# Patient Record
Sex: MALE | Race: WHITE
[De-identification: names, ages, dates, MRNs, and addresses within clinical notes are randomized per-mention and may not be internally consistent; named-entity substitution may affect disease eponyms.]

---

## 2017-01-23 ENCOUNTER — HOSPITAL ENCOUNTER (OUTPATIENT)
Dept: HOSPITAL 53 - M OUTALCOH | Age: 47
End: 2017-01-23
Attending: PSYCHIATRY & NEUROLOGY
Payer: MEDICARE

## 2017-01-23 DIAGNOSIS — F11.10: ICD-10-CM

## 2017-01-23 DIAGNOSIS — F12.10: Primary | ICD-10-CM

## 2017-01-30 ENCOUNTER — HOSPITAL ENCOUNTER (OUTPATIENT)
Dept: HOSPITAL 53 - M OUTALCOH | Age: 47
LOS: 1 days | Discharge: HOME | End: 2017-01-31
Attending: PSYCHIATRY & NEUROLOGY
Payer: MEDICARE

## 2017-01-30 DIAGNOSIS — F12.10: Primary | ICD-10-CM

## 2017-01-30 DIAGNOSIS — F11.10: ICD-10-CM

## 2017-01-30 DIAGNOSIS — F17.200: ICD-10-CM

## 2017-02-23 ENCOUNTER — HOSPITAL ENCOUNTER (OUTPATIENT)
Dept: HOSPITAL 53 - M OUTALCOH | Age: 47
LOS: 5 days | End: 2017-02-28
Attending: PSYCHIATRY & NEUROLOGY
Payer: MEDICARE

## 2017-02-23 DIAGNOSIS — F17.200: ICD-10-CM

## 2017-02-23 DIAGNOSIS — F11.10: ICD-10-CM

## 2017-02-23 DIAGNOSIS — F12.10: Primary | ICD-10-CM

## 2017-03-30 ENCOUNTER — HOSPITAL ENCOUNTER (OUTPATIENT)
Dept: HOSPITAL 53 - M OUTALCOH | Age: 47
LOS: 1 days | End: 2017-03-31
Attending: PSYCHIATRY & NEUROLOGY
Payer: MEDICARE

## 2017-03-30 DIAGNOSIS — F17.200: ICD-10-CM

## 2017-03-30 DIAGNOSIS — F12.10: Primary | ICD-10-CM

## 2017-03-30 DIAGNOSIS — F11.10: ICD-10-CM

## 2017-04-11 ENCOUNTER — HOSPITAL ENCOUNTER (OUTPATIENT)
Dept: HOSPITAL 53 - M OUTALCOH | Age: 47
LOS: 19 days | End: 2017-04-30
Attending: PSYCHIATRY & NEUROLOGY
Payer: MEDICARE

## 2017-04-11 DIAGNOSIS — F11.10: ICD-10-CM

## 2017-04-11 DIAGNOSIS — F17.200: ICD-10-CM

## 2017-04-11 DIAGNOSIS — F12.10: Primary | ICD-10-CM

## 2017-04-18 ENCOUNTER — HOSPITAL ENCOUNTER (OUTPATIENT)
Dept: HOSPITAL 53 - M EKG | Age: 47
End: 2017-04-18
Attending: FAMILY MEDICINE
Payer: MEDICARE

## 2017-04-18 DIAGNOSIS — R00.2: Primary | ICD-10-CM

## 2017-04-18 NOTE — ECGEPIP
Stationary ECG Study

                              [facility]

                                       

                                       Test Date:    2017

Pat Name:     GILLIAN MOROCHO     Department:   

Patient ID:   [id number]                 Room:         -

Gender:       M                        Technician:   TRISTAN

:          1970               Requested By: Michael Ochoa 

Order Number: [id number]     Reading MD:   Baldo Zarate

                                 Measurements

Intervals                              Axis          

Rate:         109                      P:            56

RI:           152                      QRS:          61

QRSD:         83                       T:            78

QT:           317                                    

QTc:          429                                    

                           Interpretive Statements

SINUS TACHYCARDIA WITH OCCASIONAL SUPRAVENTRICULAR PREMATURE COMPLEXES

ABNORMAL RHYTHM ECG

 

Electronically Signed On 2017 19:02:08 EDT by Baldo Zarate

## 2020-02-13 ENCOUNTER — HOSPITAL ENCOUNTER (OUTPATIENT)
Dept: HOSPITAL 53 - M LAB REF | Age: 50
End: 2020-02-13
Attending: NURSE PRACTITIONER
Payer: COMMERCIAL

## 2020-02-13 DIAGNOSIS — M54.9: ICD-10-CM

## 2020-02-13 DIAGNOSIS — E78.5: ICD-10-CM

## 2020-02-13 DIAGNOSIS — E55.9: ICD-10-CM

## 2020-02-13 DIAGNOSIS — M79.7: ICD-10-CM

## 2020-02-13 DIAGNOSIS — F41.8: ICD-10-CM

## 2020-02-13 DIAGNOSIS — N40.1: Primary | ICD-10-CM

## 2020-02-13 LAB
25(OH)D3 SERPL-MCNC: 18.9 NG/ML (ref 30–100)
ALBUMIN SERPL BCG-MCNC: 3.8 GM/DL (ref 3.2–5.2)
ALT SERPL W P-5'-P-CCNC: 29 U/L (ref 12–78)
BASOPHILS # BLD AUTO: 0.1 10^3/UL (ref 0–0.2)
BASOPHILS NFR BLD AUTO: 0.6 % (ref 0–1)
BILIRUB SERPL-MCNC: 0.3 MG/DL (ref 0.2–1)
BUN SERPL-MCNC: 12 MG/DL (ref 7–18)
CALCIUM SERPL-MCNC: 9 MG/DL (ref 8.5–10.1)
CHLORIDE SERPL-SCNC: 107 MEQ/L (ref 98–107)
CHOLEST SERPL-MCNC: 220 MG/DL (ref ?–200)
CHOLEST/HDLC SERPL: 5.79 {RATIO} (ref ?–5)
CO2 SERPL-SCNC: 25 MEQ/L (ref 21–32)
CREAT SERPL-MCNC: 0.83 MG/DL (ref 0.7–1.3)
EOSINOPHIL # BLD AUTO: 0.4 10^3/UL (ref 0–0.5)
EOSINOPHIL NFR BLD AUTO: 2.9 % (ref 0–3)
EST. AVERAGE GLUCOSE BLD GHB EST-MCNC: 117 MG/DL (ref 60–110)
GFR SERPL CREATININE-BSD FRML MDRD: > 60 ML/MIN/{1.73_M2} (ref 60–?)
GLUCOSE SERPL-MCNC: 92 MG/DL (ref 70–100)
HCT VFR BLD AUTO: 48.3 % (ref 42–52)
HDLC SERPL-MCNC: 38 MG/DL (ref 40–?)
HGB BLD-MCNC: 15.9 G/DL (ref 13.5–17.5)
LDLC SERPL CALC-MCNC: 147 MG/DL (ref ?–100)
LYMPHOCYTES # BLD AUTO: 3.2 10^3/UL (ref 1.5–5)
LYMPHOCYTES NFR BLD AUTO: 25.6 % (ref 24–44)
MCH RBC QN AUTO: 30.3 PG (ref 27–33)
MCHC RBC AUTO-ENTMCNC: 32.9 G/DL (ref 32–36.5)
MCV RBC AUTO: 92.2 FL (ref 80–96)
MONOCYTES # BLD AUTO: 0.8 10^3/UL (ref 0–0.8)
MONOCYTES NFR BLD AUTO: 6.6 % (ref 0–5)
NEUTROPHILS # BLD AUTO: 8 10^3/UL (ref 1.5–8.5)
NEUTROPHILS NFR BLD AUTO: 63.7 % (ref 36–66)
NONHDLC SERPL-MCNC: 182 MG/DL
PLATELET # BLD AUTO: 350 10^3/UL (ref 150–450)
POTASSIUM SERPL-SCNC: 4.3 MEQ/L (ref 3.5–5.1)
PROT SERPL-MCNC: 7.1 GM/DL (ref 6.4–8.2)
RBC # BLD AUTO: 5.24 10^6/UL (ref 4.3–6.1)
SODIUM SERPL-SCNC: 140 MEQ/L (ref 136–145)
T4 FREE SERPL-MCNC: 1.15 NG/DL (ref 0.76–1.46)
TRIGL SERPL-MCNC: 175 MG/DL (ref ?–150)
TSH SERPL DL<=0.005 MIU/L-ACNC: 1.56 UIU/ML (ref 0.36–3.74)
WBC # BLD AUTO: 12.6 10^3/UL (ref 4–10)

## 2020-02-13 PROCEDURE — 80061 LIPID PANEL: CPT

## 2020-02-13 PROCEDURE — 80053 COMPREHEN METABOLIC PANEL: CPT

## 2020-02-13 PROCEDURE — 85025 COMPLETE CBC W/AUTO DIFF WBC: CPT

## 2020-02-13 PROCEDURE — 84443 ASSAY THYROID STIM HORMONE: CPT

## 2020-02-13 PROCEDURE — 84439 ASSAY OF FREE THYROXINE: CPT

## 2020-02-13 PROCEDURE — 83036 HEMOGLOBIN GLYCOSYLATED A1C: CPT

## 2020-02-13 PROCEDURE — 82306 VITAMIN D 25 HYDROXY: CPT

## 2020-05-14 ENCOUNTER — HOSPITAL ENCOUNTER (OUTPATIENT)
Dept: HOSPITAL 53 - M LAB REF | Age: 50
End: 2020-05-14
Attending: NURSE PRACTITIONER
Payer: COMMERCIAL

## 2020-05-14 DIAGNOSIS — R03.0: ICD-10-CM

## 2020-05-14 DIAGNOSIS — R73.03: Primary | ICD-10-CM

## 2020-05-14 DIAGNOSIS — E55.9: ICD-10-CM

## 2020-05-14 DIAGNOSIS — E78.5: ICD-10-CM

## 2020-05-14 DIAGNOSIS — K21.9: ICD-10-CM

## 2020-05-14 DIAGNOSIS — F17.200: ICD-10-CM

## 2020-05-14 LAB
25(OH)D3 SERPL-MCNC: 15.7 NG/ML (ref 30–100)
ALBUMIN SERPL BCG-MCNC: 3.7 GM/DL (ref 3.2–5.2)
ALT SERPL W P-5'-P-CCNC: 41 U/L (ref 12–78)
BASOPHILS # BLD AUTO: 0.1 10^3/UL (ref 0–0.2)
BASOPHILS NFR BLD AUTO: 0.7 % (ref 0–1)
BILIRUB SERPL-MCNC: 0.3 MG/DL (ref 0.2–1)
BUN SERPL-MCNC: 11 MG/DL (ref 7–18)
CALCIUM SERPL-MCNC: 8.7 MG/DL (ref 8.5–10.1)
CHLORIDE SERPL-SCNC: 104 MEQ/L (ref 98–107)
CHOLEST SERPL-MCNC: 219 MG/DL (ref ?–200)
CHOLEST/HDLC SERPL: 6.84 {RATIO} (ref ?–5)
CO2 SERPL-SCNC: 28 MEQ/L (ref 21–32)
CREAT SERPL-MCNC: 0.74 MG/DL (ref 0.7–1.3)
EOSINOPHIL # BLD AUTO: 0.5 10^3/UL (ref 0–0.5)
EOSINOPHIL NFR BLD AUTO: 5 % (ref 0–3)
EST. AVERAGE GLUCOSE BLD GHB EST-MCNC: 117 MG/DL (ref 60–110)
GFR SERPL CREATININE-BSD FRML MDRD: > 60 ML/MIN/{1.73_M2} (ref 60–?)
GLUCOSE SERPL-MCNC: 89 MG/DL (ref 70–100)
HCT VFR BLD AUTO: 45.3 % (ref 42–52)
HDLC SERPL-MCNC: 32 MG/DL (ref 40–?)
HGB BLD-MCNC: 14.9 G/DL (ref 13.5–17.5)
LDLC SERPL CALC-MCNC: 138 MG/DL (ref ?–100)
LYMPHOCYTES # BLD AUTO: 3.3 10^3/UL (ref 1.5–5)
LYMPHOCYTES NFR BLD AUTO: 31.6 % (ref 24–44)
MCH RBC QN AUTO: 29.9 PG (ref 27–33)
MCHC RBC AUTO-ENTMCNC: 32.9 G/DL (ref 32–36.5)
MCV RBC AUTO: 91 FL (ref 80–96)
MONOCYTES # BLD AUTO: 1 10^3/UL (ref 0–0.8)
MONOCYTES NFR BLD AUTO: 9.4 % (ref 0–5)
NEUTROPHILS # BLD AUTO: 5.5 10^3/UL (ref 1.5–8.5)
NEUTROPHILS NFR BLD AUTO: 52.7 % (ref 36–66)
NONHDLC SERPL-MCNC: 187 MG/DL
PLATELET # BLD AUTO: 320 10^3/UL (ref 150–450)
POTASSIUM SERPL-SCNC: 4.5 MEQ/L (ref 3.5–5.1)
PROT SERPL-MCNC: 7.3 GM/DL (ref 6.4–8.2)
RBC # BLD AUTO: 4.98 10^6/UL (ref 4.3–6.1)
SODIUM SERPL-SCNC: 138 MEQ/L (ref 136–145)
TRIGL SERPL-MCNC: 247 MG/DL (ref ?–150)
WBC # BLD AUTO: 10.5 10^3/UL (ref 4–10)

## 2021-05-07 ENCOUNTER — HOSPITAL ENCOUNTER (EMERGENCY)
Dept: HOSPITAL 53 - M ED | Age: 51
Discharge: HOME | End: 2021-05-07
Payer: COMMERCIAL

## 2021-05-07 VITALS — BODY MASS INDEX: 33.67 KG/M2 | WEIGHT: 214.51 LBS | HEIGHT: 67 IN

## 2021-05-07 VITALS — SYSTOLIC BLOOD PRESSURE: 138 MMHG | DIASTOLIC BLOOD PRESSURE: 98 MMHG

## 2021-05-07 DIAGNOSIS — G47.30: ICD-10-CM

## 2021-05-07 DIAGNOSIS — K85.10: ICD-10-CM

## 2021-05-07 DIAGNOSIS — F43.10: ICD-10-CM

## 2021-05-07 DIAGNOSIS — E27.8: ICD-10-CM

## 2021-05-07 DIAGNOSIS — R10.9: ICD-10-CM

## 2021-05-07 DIAGNOSIS — I10: ICD-10-CM

## 2021-05-07 DIAGNOSIS — F11.23: Primary | ICD-10-CM

## 2021-05-07 DIAGNOSIS — F12.10: ICD-10-CM

## 2021-05-07 DIAGNOSIS — R11.2: ICD-10-CM

## 2021-05-07 DIAGNOSIS — J44.9: ICD-10-CM

## 2021-05-07 DIAGNOSIS — F17.200: ICD-10-CM

## 2021-05-07 LAB
ALBUMIN SERPL BCG-MCNC: 3.6 GM/DL (ref 3.2–5.2)
ALT SERPL W P-5'-P-CCNC: 39 U/L (ref 12–78)
AMPHETAMINES UR QL SCN: NEGATIVE
BARBITURATES UR QL SCN: NEGATIVE
BASOPHILS # BLD AUTO: 0.1 10^3/UL (ref 0–0.2)
BASOPHILS NFR BLD AUTO: 0.4 % (ref 0–1)
BENZODIAZ UR QL SCN: NEGATIVE
BILIRUB CONJ SERPL-MCNC: < 0.1 MG/DL (ref 0–0.2)
BILIRUB SERPL-MCNC: 0.6 MG/DL (ref 0.2–1)
BUN SERPL-MCNC: 11 MG/DL (ref 7–18)
BZE UR QL SCN: NEGATIVE
CALCIUM SERPL-MCNC: 8.8 MG/DL (ref 8.5–10.1)
CANNABINOIDS UR QL SCN: POSITIVE
CHLORIDE SERPL-SCNC: 111 MEQ/L (ref 98–107)
CK MB CFR.DF SERPL CALC: 0.43
CK MB SERPL-MCNC: 3.3 NG/ML (ref ?–3.6)
CK SERPL-CCNC: 773 U/L (ref 39–308)
CO2 SERPL-SCNC: 24 MEQ/L (ref 21–32)
CREAT SERPL-MCNC: 0.75 MG/DL (ref 0.7–1.3)
EOSINOPHIL # BLD AUTO: 0.2 10^3/UL (ref 0–0.5)
EOSINOPHIL NFR BLD AUTO: 1.4 % (ref 0–3)
GFR SERPL CREATININE-BSD FRML MDRD: > 60 ML/MIN/{1.73_M2} (ref 60–?)
GLUCOSE SERPL-MCNC: 111 MG/DL (ref 70–100)
HCT VFR BLD AUTO: 41.6 % (ref 42–52)
HGB BLD-MCNC: 14.2 G/DL (ref 13.5–17.5)
LIPASE SERPL-CCNC: 973 U/L (ref 73–393)
LYMPHOCYTES # BLD AUTO: 2.5 10^3/UL (ref 1.5–5)
LYMPHOCYTES NFR BLD AUTO: 14.3 % (ref 24–44)
MCH RBC QN AUTO: 30.3 PG (ref 27–33)
MCHC RBC AUTO-ENTMCNC: 34.1 G/DL (ref 32–36.5)
MCV RBC AUTO: 88.9 FL (ref 80–96)
METHADONE UR QL SCN: NEGATIVE
MONOCYTES # BLD AUTO: 0.8 10^3/UL (ref 0–0.8)
MONOCYTES NFR BLD AUTO: 4.7 % (ref 2–8)
NEUTROPHILS # BLD AUTO: 13.5 10^3/UL (ref 1.5–8.5)
NEUTROPHILS NFR BLD AUTO: 78.5 % (ref 36–66)
OPIATES UR QL SCN: NEGATIVE
PCP UR QL SCN: NEGATIVE
PLATELET # BLD AUTO: 330 10^3/UL (ref 150–450)
POTASSIUM SERPL-SCNC: 4.4 MEQ/L (ref 3.5–5.1)
PROT SERPL-MCNC: 7 GM/DL (ref 6.4–8.2)
RBC # BLD AUTO: 4.68 10^6/UL (ref 4.3–6.1)
SODIUM SERPL-SCNC: 141 MEQ/L (ref 136–145)
TROPONIN I SERPL-MCNC: < 0.02 NG/ML (ref ?–0.1)
WBC # BLD AUTO: 17.1 10^3/UL (ref 4–10)

## 2021-05-07 PROCEDURE — 85025 COMPLETE CBC W/AUTO DIFF WBC: CPT

## 2021-05-07 PROCEDURE — 96375 TX/PRO/DX INJ NEW DRUG ADDON: CPT

## 2021-05-07 PROCEDURE — 84484 ASSAY OF TROPONIN QUANT: CPT

## 2021-05-07 PROCEDURE — 96374 THER/PROPH/DIAG INJ IV PUSH: CPT

## 2021-05-07 PROCEDURE — 81001 URINALYSIS AUTO W/SCOPE: CPT

## 2021-05-07 PROCEDURE — 82550 ASSAY OF CK (CPK): CPT

## 2021-05-07 PROCEDURE — 80048 BASIC METABOLIC PNL TOTAL CA: CPT

## 2021-05-07 PROCEDURE — 99284 EMERGENCY DEPT VISIT MOD MDM: CPT

## 2021-05-07 PROCEDURE — 96361 HYDRATE IV INFUSION ADD-ON: CPT

## 2021-05-07 PROCEDURE — 82553 CREATINE MB FRACTION: CPT

## 2021-05-07 PROCEDURE — 80076 HEPATIC FUNCTION PANEL: CPT

## 2021-05-07 PROCEDURE — 74021 RADEX ABDOMEN 3+ VIEWS: CPT

## 2021-05-07 PROCEDURE — 80307 DRUG TEST PRSMV CHEM ANLYZR: CPT

## 2021-05-07 PROCEDURE — 74177 CT ABD & PELVIS W/CONTRAST: CPT

## 2021-05-07 PROCEDURE — 83690 ASSAY OF LIPASE: CPT

## 2021-05-07 NOTE — REP
INDICATION:

mid abd pain, n/v, leukocytosis



COMPARISON:

07/01/2016.



TECHNIQUE:

CT Scan of the abdomen and pelvis was performed with intravenous administration of 100

cc of Isovue 370, without oral contrast.  Sagittal and coronal reconstruction images

are performed.



FINDINGS:

Lung bases: 2 tiny calcified granulomas seen in the right lower lobe.

Liver:  There is diffuse fatty infiltration of the liver.  Calcified granulomas seen

at the dome of the liver.

Gallbladder:  Unremarkable.

Spleen:  Normal.

Adrenals:  Bilateral adrenal nodules are unchanged in size, on the right measuring

approximately 3.3 cm in maximum diameter and on the left 1.8 cm.  There are 2 new

coarse calcifications in superior aspect of the right adrenal nodule.

Pancreas:  Normal.

Kidneys:  Normal.

Small and large bowel: There is diverticulosis of the left colon with no CT evidence

of acute diverticulitis..

Free fluid:  None.

Abdominal aorta: No aneurysm or dissection.

Adenopathy:  None.

Appendix: Prior appendectomy.

Osseous structures:  There is spondylolysis of L5 with very mild anterior grade 1

spondylolisthesis of L5 on S1.  There are mild degenerative disc changes at L4-5 and

L5-S1..

Pelvis:  No mass.



IMPRESSION:

Chronic findings as discussed above.  No CT evidence of acute pathology in the abdomen

or pelvis.





<Electronically signed by Abelardo Hendrickson > 05/07/21 0939

## 2021-05-07 NOTE — REP
INDICATION:

Abdominal Pain.



COMPARISON:

Comparison chest x-ray August 29, 2014..



TECHNIQUE:

Three views including upright chest radiograph.



FINDINGS:

Upright chest radiograph is unremarkable.  There is no evidence of infiltrate or free

subdiaphragmatic air.  Heart size is normal.  Pulmonary vasculature is not increased.

Pleural angles are sharp.



Supine and erect views of the abdomen demonstrate a normal bowel gas pattern.  The

psoas margins and the flank stripes are intact.  There is no evidence of mass,

organomegaly, or pathologic calcification.



IMPRESSION:

Negative acute abdominal series.





<Electronically signed by Ron Mcdonough > 05/07/21 0779

## 2021-08-26 ENCOUNTER — HOSPITAL ENCOUNTER (OUTPATIENT)
Dept: HOSPITAL 53 - M LAB REF | Age: 51
End: 2021-08-26
Attending: NURSE PRACTITIONER
Payer: COMMERCIAL

## 2021-08-26 DIAGNOSIS — R73.03: ICD-10-CM

## 2021-08-26 DIAGNOSIS — K21.9: Primary | ICD-10-CM

## 2021-08-26 DIAGNOSIS — E78.5: ICD-10-CM

## 2021-08-26 DIAGNOSIS — F31.9: ICD-10-CM

## 2021-08-26 DIAGNOSIS — F17.200: ICD-10-CM

## 2021-08-26 DIAGNOSIS — Z79.899: ICD-10-CM

## 2021-08-26 LAB
25(OH)D3 SERPL-MCNC: 19.9 NG/ML (ref 30–100)
ALBUMIN SERPL BCG-MCNC: 4.1 GM/DL (ref 3.2–5.2)
ALT SERPL W P-5'-P-CCNC: 47 U/L (ref 12–78)
BASOPHILS # BLD AUTO: 0.1 10^3/UL (ref 0–0.2)
BASOPHILS NFR BLD AUTO: 0.4 % (ref 0–1)
BILIRUB SERPL-MCNC: 0.4 MG/DL (ref 0.2–1)
BUN SERPL-MCNC: 18 MG/DL (ref 7–18)
CALCIUM SERPL-MCNC: 9.4 MG/DL (ref 8.5–10.1)
CHLORIDE SERPL-SCNC: 109 MEQ/L (ref 98–107)
CHOLEST SERPL-MCNC: 241 MG/DL (ref ?–200)
CHOLEST/HDLC SERPL: 6.34 {RATIO} (ref ?–5)
CO2 SERPL-SCNC: 24 MEQ/L (ref 21–32)
CREAT SERPL-MCNC: 0.79 MG/DL (ref 0.7–1.3)
EOSINOPHIL # BLD AUTO: 0.3 10^3/UL (ref 0–0.5)
EOSINOPHIL NFR BLD AUTO: 2.5 % (ref 0–3)
EST. AVERAGE GLUCOSE BLD GHB EST-MCNC: 123 MG/DL (ref 60–110)
FERRITIN SERPL-MCNC: 122 NG/ML (ref 26–388)
FOLATE SERPL-MCNC: 13.2 NG/ML
GFR SERPL CREATININE-BSD FRML MDRD: > 60 ML/MIN/{1.73_M2} (ref 60–?)
GLUCOSE SERPL-MCNC: 102 MG/DL (ref 70–100)
HCT VFR BLD AUTO: 41.4 % (ref 42–52)
HCV AB SER QL: < 0 INDEX (ref ?–0.8)
HDLC SERPL-MCNC: 38 MG/DL (ref 40–?)
HGB BLD-MCNC: 13.7 G/DL (ref 13.5–17.5)
HIV 1+2 AB+HIV1 P24 AG SERPL QL IA: NEGATIVE
IRON SATN MFR SERPL: 21.2 % (ref 19.7–50)
IRON SERPL-MCNC: 65 UG/DL (ref 65–175)
LDLC SERPL CALC-MCNC: 162 MG/DL (ref ?–100)
LYMPHOCYTES # BLD AUTO: 2.3 10^3/UL (ref 1.5–5)
LYMPHOCYTES NFR BLD AUTO: 20 % (ref 24–44)
MCH RBC QN AUTO: 30.7 PG (ref 27–33)
MCHC RBC AUTO-ENTMCNC: 33.1 G/DL (ref 32–36.5)
MCV RBC AUTO: 92.8 FL (ref 80–96)
MONOCYTES # BLD AUTO: 0.6 10^3/UL (ref 0–0.8)
MONOCYTES NFR BLD AUTO: 4.7 % (ref 2–8)
NEUTROPHILS # BLD AUTO: 8.4 10^3/UL (ref 1.5–8.5)
NEUTROPHILS NFR BLD AUTO: 71.7 % (ref 36–66)
NONHDLC SERPL-MCNC: 203 MG/DL
PLATELET # BLD AUTO: (no result) 10^3/UL (ref 150–450)
POTASSIUM SERPL-SCNC: 4.6 MEQ/L (ref 3.5–5.1)
PROT SERPL-MCNC: 7.4 GM/DL (ref 6.4–8.2)
RBC # BLD AUTO: 4.46 10^6/UL (ref 4.3–6.1)
SODIUM SERPL-SCNC: 140 MEQ/L (ref 136–145)
T4 FREE SERPL-MCNC: 1.1 NG/DL (ref 0.76–1.46)
TIBC SERPL-MCNC: 307 UG/DL (ref 250–450)
TRIGL SERPL-MCNC: 203 MG/DL (ref ?–150)
TSH SERPL DL<=0.005 MIU/L-ACNC: 1.41 UIU/ML (ref 0.36–3.74)
VIT B12 SERPL-MCNC: 553 PG/ML
WBC # BLD AUTO: 11.7 10^3/UL (ref 4–10)

## 2021-08-30 LAB — PSA SERPL-MCNC: 1.5 NG/ML (ref 0–4)

## 2021-11-10 NOTE — CCD
Ambulatory Summary

                             Created on: 2021



Randall Pickering

External Reference #: [id number]

: 1970

Sex: Male



Demographics





                          Address                   [address] [place] [address]  [address]

 

                          Home Phone                [phone]

 

                          Preferred Language        English

 

                          Marital Status            

 

                          Christian Affiliation     Unknown

 

                          Race                      White

 

                          Ethnic Group              Unknown





Author





                          Organization              Unknown

 

                          Address                   [address] [place] [address]  [address]



 

                          Phone                     [phone]







Care Team Providers





                    Care Team Member Name Role                Phone

 

                    Marii Vaughn  Unavailable         Unavailable



                                                      



Allergies

          



          Code      Code System Name      Reaction  Severity  Status    Onset

 

             NKDA                                             



                                                                              



Medications

                      



                Name                   Status                   Start Date      

             

Stop Date                                                            

 

                                        amoxicillin 500 mg capsule

 TAKE TWO CAPSULES BY MOUTH FOR FIRST DOSE THEN TAKE ONE CAPSULE BY MOUTH EVERY 
8 HOURS UNTIL GONE  Completed                              2021

 

                                        buprenorphine 8 mg-naloxone 2 mg subling

ual film

 PLACE 2   1/2 FILMS UNDER THE TONGUE ONCE DAILY FOR OPIOID DEPENDENCE MAXIMUM 
DAILY DOSE   2   1/2 FILMS Active                                 Not available

 

                                        gabapentin 100 mg capsule

 TAKE ONE CAPSULE BY MOUTH TWICE DAILY Completed                              0

2021

 

                                        ibuprofen 800 mg tablet

 TAKE ONE TABLET BY MOUTH EVERY EIGHT HOURS AS NEEDED Completed                

              2021

 

                                        naproxen 500 mg tablet

 TAKE ONE TABLET BY MOUTH TWICE DAILY Active                                 No

t available

 

                                        Nicotrol 10 mg inhalation cartridge

 use as directed to assist with smoking cessation. Active                      

           Not available

 

                                        omeprazole 20 mg capsule,delayed release

 TAKE ONE CAPSULE BY MOUTH ONCE DAILY Active                                 No

t available

 

                                        tamsulosin 0.4 mg capsule

 TAKE ONE CAPSULE BY MOUTH ONCE DAILY Active                                 No

t available



                                                                                
                                                                             



Problems

                      



                Name                   Status                   Onset Date      

             

Source                                                  

 

                Bipolar Disorder Active          2015      History

 

                Obstructive Sleep Apnea Syndrome Active          2015     

 History

 

                Fibromyalgia    Active          2015      History

 

                Emotional State Finding Active          2015      History

 

                Backache        Active          2015      History

 

                Disorder of Prostate Active          10/21/2015      History

 

                Vitamin D Deficiency Active          2016      History

 

                Hyperlipidemia  Active          2016      History

 

                Cellulitis of Left Upper Limb Active          2016      Hi

story

 

                Current Drug User Active          10/21/2016      History

 

                Palpitations    Active          2017      History

 

                Cough           Active          2017      History

 

                Finding of Esophagus Active          2017      History

 

                Nondependent Opioid Abuse in Remission Active          

8      History

 

                Finding of Neck Region Active          2019      History

 

                SNOMED CT Concept Active          2019      History

 

                Lower Urinary Tract Finding Active          2019      Hist

ory

 

                Nicotine Dependence Active          2020      History

 

                    Elevated Blood-pressure Reading without Diagnosis of Hyperte

nsion Active              

2020                              History

 

                Clinical Finding Active          2020      History

 

                Generalized Anxiety Disorder Active          02/10/2020      His

tory

 

                Prediabetes     Active          2020      History

 

                Finding by Site Active          2020      History

 

                Mild Recurrent Major Depression Active          2020      

History

 

                Posttraumatic Stress Disorder Active          2020      Hi

story

 

                Ear Finding     Active          2020      History

 

                Hearing Finding Active          2020      History

 

                Patient Asked to Attend Active          2020      History

 

                Dental Caries on Smooth Surface Penetrating into Pulp Active    

      2020      History



                                                                                
                                                                                
                                                                                
                                                                                
                                            



Procedures

          







                                        Notes: No known surgical history



                                                                              



Results

                          



                                        Lab Results

 

                                         



                



      Date  Name  Specimen Result Interpretation Description Value Range Status 

Address 



 

           2021 HbA1C (Hemoglobin a1C), Blood Blood  venous Normal        

         Hemoglobin a1C 

                5.9 %                          [facility]: [address] [place] [address]

 

                      Blood  venous High             Estimated Average Glucose

  123 mg/dL  mg/dL 

[facility]: [address] [place] [address]

 

        2021 CBC W/ Auto Diff Blood  venous High             White Blood C

ount  11.7 10 

4.0-10.0 10               [facility]: [address]

[address] [facility]

 

                   Blood  venous Normal         Red Blood Count  4.46 10 4.30-

6.10 10 [facility]: [address] [facility]

 

                   Blood  venous Normal         Hemoglobin  13.7 g/dL 13.5-17.

5 g/dL [facility]: [address] [facility]

 

                   Blood  venous Low            Hematocrit  41.4 % 42.0-52.0 %

 [facility]: [address] [facility]

 

                   Blood  venous Normal         Mean Corpuscular Volume  92.8 

fL 80.0-96.0 fL [facility]: [address] [facility]

 

                      Blood  venous Normal           Mean Corpuscular Hemoglob

in  30.7 pg 27.0-33.0 pg 

[facility]: [address] [facility]

 

                      Blood  venous Normal           Mean Corpuscular HGB Conc

  33.1 g/dL 32.0-36.5 g/dL 

[facility]: [address] [facility]

 

                      Blood  venous Normal           Red Cell Distribution Wid

th  14.2 %  11.5-14.5 % 

[facility]: [address] [place] [address]

 

                   Blood  venous Normal         Platelet Count, Automated  tnp

 10 150-450 10 [facility]: [address] [place] [address]

 

                   Blood  venous High           Neutrophils %  71.7 % 36.0-66.

0 % [facility]: [address] [place] [address]

 

                   Blood  venous Low            Lymph %  20.0 % 24.0-44.0 % Fi

[facility]: [address] [place] [address]

 

                   Blood  venous Normal         Mono %  4.7 %  2.0-8.0 % [facility]: [address] [place] [address]

 

                   Blood  venous Normal         Eos %  2.5 %  0.0-3.0 % [facility]: [address] [place] [address]

 

                   Blood  venous Normal         Baso %  0.4 %  0.0-1.0 % [facility]: [address] [place] [address]

 

                   Blood  venous Normal         Immature Granulocyte %  0.7 % 

 0-3.0 % [facility]: [address] [place] [address]

 

                   Blood  venous Normal         Nucleated Red Blood Cell %  0.

0 %  0-0 %  [facility]: [address] [place] [address]

 

                   Blood  venous Normal         Neutrophils #  8.4 10 1.5-8.5 

10 [facility]: [address] [place] [address]

 

                   Blood  venous Normal         Lymph #  2.3 10 1.5-5.0 10 [facility]: [address] [place] [address]

 

                   Blood  venous Normal         Mono #  0.6 10 0.0-0.8 10 [facility]: [address] [place] [address]

 

                   Blood  venous Normal         Eos #  0.3 10 0.0-0.5 10 [facility]: [address] [place] [address]

 

                   Blood  venous Normal         Baso #  0.1 10 0.0-0.2 10 [facility]: [address] [place] [address]

 

          2021 CMP, Serum or Plasma Blood  venous High                 Glu

cose, Fasting  102 

mg/dL                mg/dL        [facility]

nter: [address], [place]

 

                   Blood  venous Normal         Blood Urea Nitrogen  18 mg/dL 

7-18 mg/dL [facility]: [address] [facility]

 

                      Blood  venous Normal           Creatinine for GFR  0.79 

mg/dL 0.70-1.30 mg/dL [facility]: [address] [facility]

 

                   Blood  venous Normal         Glomerular Filtration Rate  > 

60.0 >60    [facility]: [address] [facility]

 

                   Blood  venous Normal         Sodium Level  140 mEq/L 136-14

5 mEq/L [facility]: [address] [facility]

 

                   Blood  venous Normal         Potassium Serum  4.6 mEq/L 3.5

-5.1 mEq/L [facility]: [address] [facility]

 

                   Blood  venous High           Chloride Level  109 mEq/L 98-1

07 mEq/L [facility]: [address] [facility]

 

                   Blood  venous Normal         Carbon Dioxide Level  24 mEq/L

 21-32 mEq/L [facility]: [address] [facility]

 

                   Blood  venous Low            Anion Gap  7 mEq/L 8-16 mEq/L 

[facility]: [address] [facility]

 

                   Blood  venous Normal         Calcium Level  9.4 mg/dL 8.5-1

0.1 mg/dL [facility]: [address] [facility]

 

                   Blood  venous Normal         AST/SGOT  23 U/L 7-37 U/L [facility]: [address] [facility]

 

                   Blood  venous Normal         ALT/SGPT  47 U/L 12-78 U/L [facility]: [address] [facility]

 

                   Blood  venous Normal         Alkaline Phosphatase  108 U/L 

 U/L [facility]: [address] [facility]

 

                   Blood  venous Normal         Bilirubin,total  0.4 mg/dL 0.2

-1.0 mg/dL [facility]: [address] [facility]

 

                   Blood  venous Normal         Total Protein  7.4 gm/dL 6.4-8

.2 gm/dL [facility]: [address] [facility]

 

                   Blood  venous Normal         Albumin  4.1 gm/dL 3.2-5.2 gm/

dL [facility]: [address] [place] [address]

 

                   Blood  venous Normal         Albumin/globulin Ratio  1.2   

       [facility]: [address] [facility]

 

           2021 Hepatitis C Ab, Serum Blood  venous Normal                

 Hepatitis C Virus [place] 

Index           < 0.0 index     <0.8 index      [facility]: [address] [place] [address]

 

          2021 Lipid Panel, Blood Blood  venous High                 Trigl

ycerides Level  203 

mg/dL               <150 mg/dL          [facility]

nter: [address] [facility]

 

                   Blood  venous High           Cholesterol Level  241 mg/dL <

200 mg/dL [facility]: [address] [place] [address]

 

                   Blood  venous Low            HDL Cholesterol  38 mg/dL >40 

mg/dL [facility]: [address] [place] [address]

 

                   Blood  venous High           LDL Cholesterol  162 mg/dL <10

0 mg/dL [facility]: [address] [place] [address]

 

                   Blood  venous Normal         Non-hdl-c  203 mg/dL       Fi

[facility]: [address] [place] [address]

 

                   Blood  venous High           Cholesterol Risk Ratio  6.342 

 <5     [facility]: [address] [place] [address]

 

          2021 TIBC (Total Iron-binding Capacity), Serum           Normal 

              Iron (Fe)  65 

ug/dL                ug/dL        [facility]

nter: [address] [place] [address]

 

                          Normal         Total Iron Binding Capacity  307 ug/d

L 250-450 ug/dL [facility]: [address] [place] [address]

 

                          Normal         Percent Saturation  21.2 % 19.7-50.0 

% [facility]: [address] [place] [address]

 

           2021 TSH + Free T4, Serum Blood  venous Normal                 

Thyroid Stimulating 

Hormone         1.410 uIU/mL    0.358-3.740 uIU/mL [facility]: [address] [place] [address]

 

                   Blood  venous Normal         Free T4  1.10 NG/dL 0.76-1.46 

NG/dL [facility]: [address] [place] [address]

 

           2021 Vitamin B12 + Folate, Serum or Blood Blood  venous Normal 

                Vitamin 

B12 Level       553 pg/mL                      Final           [facility]: [address] [facility]

 

                   Blood  venous Normal         Folate  13.2 NG/mL       Zari

l  [facility]:

[address] [facility]

 

           2021 Vitamin D, 25-Hydroxy, Total, Serum Blood  venous Low     

               Total 25(Oh)

Vitamin D       19.9 NG/mL      30.0-100.0 NG/mL Final           [facility]: [address] 

[facility]

 

        2021 Ferritin, Serum or Plasma         Normal           Ferritin  

122 NG/mL  

NG/mL                     Final                     [facility]: [address]

[address] [facility]

 

           2021 HIV 1+2 AB + HIV 1 P24 Ag, Qualitative Immunoassay, Serum 

           Normal                

HIV 1&2 Screen Centaur  negative        negative        Final           [facility]: [address] 

[facility]



                                                                                
                                                                                
                



Past Encounters

                      



                                        2021

[facility] [address] [place] [address], Ph. [phone] 
[phone]

 

                                        2021

Body Mass Index 30+ - Obesity; Gastroesophageal Reflux Disease without 
Esophagitis; Nicotine Dependence with Current Use; Benign Prostatic Hypertrophy 
with Outflow Obstruction; History of Substance Abuse; Bunion; Adult Health 
Examination

[facility] [address] [place] [address], Ph. [phone] 
[phone]



                                                                                
                 



Social History

          



                    Tobacco Smoking Status Heavy Tobacco Smoker (1 pack per a da

y)  



                                                                              



Vaccine List

          



                                        Vaccine Type

 

                                        COVID-19, mRNA, LNP-S, PF, 100 mcg/0.5 m

L dose

 

                                        2021



                                                                                
       



Plan of Care

          





Patient Instructions



                                        Physical exam done today. Please continu

e medications as prescribed. Please 

continue healthy diet and physical activities. Please try to limit sugars and 
carbohydrates in your diet. 

Please try to maintain adequate intake of water daily.









                Reminders                                       Provider

 

                Appointments    None recorded.                 

 

                Lab             None recorded.                 

 

                Referral        None recorded.                 

 

                Procedures      None recorded.                 

 

                Surgeries       None recorded.                 

 

                Imaging         None recorded.                 



                                                                              



Vitals

          2021 08:20AM NURSE LAB COLLECTION





                                        Height 

 

                                         66 in  



2021 02:00PM ED FOLLOW-UP





                Height          Weight          BMI             Blood Pressure 

 

                 66 in           207 lbs 4 oz     33.5 kg/m2      130/81 mm[Hg] 

 



2020





                Height          Weight          BMI             Blood Pressure 

 

                 65 in           203 lbs 8 oz     33.99 kg/m2     123/79 mm[Hg] 

 



2020





                Height          Weight          BMI             Blood Pressure 

 

                 65 in           200 lbs         33.40 kg/m2     135/86 mm[Hg]  



2020





                Height          Weight          BMI             Blood Pressure 

 

                 65 in           195 lbs         32.57 kg/m2     144/96 mm[Hg]  



2019





                Height          Weight          BMI             Blood Pressure 

 

                 65 in           202 lbs         33.74 kg/m2     122/87 mm[Hg]

## 2021-11-10 NOTE — CCD
Ambulatory Summary

                             Created on: 2021



Randall Pickering

External Reference #: [id number]

: 1970

Sex: Male



Demographics





                          Address                   [address] [facility] [address] [place] [address]  [address]

 

                          Home Phone                [phone]

 

                          Preferred Language        English

 

                          Marital Status            

 

                          Taoist Affiliation     Unknown

 

                          Race                      White

 

                          Ethnic Group              Unknown





Author





                          Organization              Unknown

 

                          Address                   [address] [place] [address]  [address]



 

                          Phone                     [phone]







Care Team Providers





                    Care Team Member Name Role                Phone

 

                    Marii Vaughn  Unavailable         Unavailable



                                                      



Allergies

                      



                Code                   Code System                   Name       

            

Reaction                   Severity                   Status                   

Onset                

 

                                              NKDA                              

                         

                                                                          



                                                                                
       



Medications

                      



                Name                   Status                   Start Date      

             

Stop Date                                                            

 

                                        amoxicillin 500 mg capsule

 TAKE TWO CAPSULES BY MOUTH FOR FIRST DOSE THEN TAKE ONE CAPSULE BY MOUTH EVERY 
8 HOURS UNTIL GONE  Completed                              2021

 

                                        buprenorphine 8 mg-naloxone 2 mg subling

ual film

 PLACE 2   1/2 FILMS UNDER THE TONGUE ONCE DAILY FOR OPIOID DEPENDENCE MAXIMUM 
DAILY DOSE   2   1/2 FILMS Active                                 Not available

 

                                        gabapentin 100 mg capsule

 TAKE ONE CAPSULE BY MOUTH TWICE DAILY Completed                              0

2021

 

                                        ibuprofen 800 mg tablet

 TAKE ONE TABLET BY MOUTH EVERY EIGHT HOURS AS NEEDED Completed                

              2021

 

                                        naproxen 500 mg tablet

 TAKE ONE TABLET BY MOUTH TWICE DAILY Active                                 No

t available

 

                                        Nicotrol 10 mg inhalation cartridge

 use as directed to assist with smoking cessation. Active                      

           Not available

 

                                        omeprazole 20 mg capsule,delayed release

 Take 1 capsule every day by oral route. Active                                

 Not available

 

                                        tamsulosin 0.4 mg capsule

 TAKE ONE CAPSULE BY MOUTH ONCE DAILY Active                                 No

t available



                                                                                
                                                                             



Problems

                      



                Name                   Status                   Onset Date      

             

Source                                                  

 

                Bipolar Disorder Active          2015      History

 

                Obstructive Sleep Apnea Syndrome Active          2015     

 History

 

                Fibromyalgia    Active          2015      History

 

                Emotional State Finding Active          2015      History

 

                Backache        Active          2015      History

 

                Disorder of Prostate Active          10/21/2015      History

 

                Vitamin D Deficiency Active          2016      History

 

                Hyperlipidemia  Active          2016      History

 

                Cellulitis of Left Upper Limb Active          2016      Hi

story

 

                Current Drug User Active          10/21/2016      History

 

                Palpitations    Active          2017      History

 

                Cough           Active          2017      History

 

                Finding of Esophagus Active          2017      History

 

                Nondependent Opioid Abuse in Remission Active          

8      History

 

                Finding of Neck Region Active          2019      History

 

                SNOMED CT Concept Active          2019      History

 

                Lower Urinary Tract Finding Active          2019      Hist

ory

 

                Nicotine Dependence Active          2020      History

 

                    Elevated Blood-pressure Reading without Diagnosis of Hyperte

nsion Active              

2020                              History

 

                Clinical Finding Active          2020      History

 

                Generalized Anxiety Disorder Active          02/10/2020      His

tory

 

                Prediabetes     Active          2020      History

 

                Finding by Site Active          2020      History

 

                Mild Recurrent Major Depression Active          2020      

History

 

                Posttraumatic Stress Disorder Active          2020      Hi

story

 

                Ear Finding     Active          2020      History

 

                Hearing Finding Active          2020      History

 

                Patient Asked to Attend Active          2020      History

 

                Dental Caries on Smooth Surface Penetrating into Pulp Active    

      2020      History



                                                                                
                                                                                
                                                                                
                                                                                
                                            



Procedures

          







                                        Notes: No known surgical history



                                                                              



Results

                          



                                        Lab Results

 

                                         



                



None recorded.                                                                  
           



Past Encounters

                      



                                        2021

Body Mass Index 30+ - Obesity; Gastroesophageal Reflux Disease without 
Esophagitis; Nicotine Dependence with Current Use; Benign Prostatic Hypertrophy 
with Outflow Obstruction; History of Substance Abuse; Bunion; Adult Health 
Examination

Marii Vaughn, [facility]: [address] [place] [id number], Ph. [phone]



                                                                                
       



Social History

          



                    Tobacco Smoking Status Heavy Tobacco Smoker (1 pack per a da

y)  



                                                                              



Vaccine List

          



                                        Vaccine Type

 

                                        COVID-19, mRNA, LNP-S, PF, 100 mcg/0.5 m

L dose

 

                                        2021



                                                                                
       



Plan of Care

          





Patient Instructions



                                        Physical exam done today. Please continu

e medications as prescribed. Please 

continue healthy diet and physical activities. Please try to limit sugars and 
carbohydrates in your diet. 

Please try to maintain adequate intake of water daily.









                Reminders                                       Provider

 

                Appointments    None recorded.                 

 

                Lab             None recorded.                 

 

                Referral        None recorded.                 

 

                Procedures      None recorded.                 

 

                Surgeries       None recorded.                 

 

                Imaging         None recorded.                 



                                                                              



Vitals

          2021 02:00PM ED FOLLOW-UP





                Height          Weight          BMI             Blood Pressure 

 

                 66 in           207 lbs 4 oz     33.5 kg/m2      130/81 mm[Hg] 

 



2020





                Height          Weight          BMI             Blood Pressure 

 

                 65 in           203 lbs 8 oz     33.99 kg/m2     123/79 mm[Hg] 

 



2020





                Height          Weight          BMI             Blood Pressure 

 

                 65 in           200 lbs         33.40 kg/m2     135/86 mm[Hg]  



2020





                Height          Weight          BMI             Blood Pressure 

 

                 65 in           195 lbs         32.57 kg/m2     144/96 mm[Hg]  



2019





                Height          Weight          BMI             Blood Pressure 

 

                 65 in           202 lbs         33.74 kg/m2     122/87 mm[Hg]

## 2021-11-10 NOTE — CCD
Continuity of Care Document (CCD)

                             Created on: 2021



Randall Pickering

External Reference #: [id number]

: 1970

Sex: Male



Demographics





                          Address                   [address] [place] [address]  [address]

 

                          Home Phone                [phone]

 

                          Preferred Language        Unknown

 

                          Marital Status            Unknown

 

                          Caodaism Affiliation     Unknown

 

                          Race                      White

 

                          Ethnic Group              Not  or 





Author





                          Author                    Randall BARAKAT DPCHUCKY

 

                          Organization              Unknown

 

                          Address                   [address] [facility]

[place]  [address]



 

                          Phone                     [phone]







Care Team Providers





                    Care Team Member Name Role                Phone

 

                    Marii Vaughn AUTM                [phone]







Problems





                    Active Problems     Provider            Date

 

                    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 

2021







Social History





                Type            Date            Description     Comments

 

                Birth Sex                       Unknown          

 

                ETOH Use                        Denies alcohol use  

 

                Tobacco Use     Start: Unknown End: Unknown Patient is a former 

smoker smoked for 25

years   uses vape pen now 







Allergies and adverse reactions





                                        Description

 

                                        No Known Drug Allergies







Medications





           Active Medications SIG        Qnty       Indications Ordering Provide

r Date

 

                          Omeprazole                     20mg Capsules DR       

            Take One 

Capsule By Mouth Once Daily                                 Unknown         

 

                          Tamsulosin HCL                     0.4mg Capsules     

              Take One 

Capsule By Mouth Once Daily                                 Unknown         

 

                          Buprenorphine HCL                     8mg Tablets Sub 

                  Dissolve

1   1/2 Tablets In Mouth Once Daily Maximum Daily Dose   1   1/2 Tablet         

                                

Unknown                                 

 

                                        Buprenorphine Hydrochloride/Naloxone Hyd

rochloride                     8-2mg 

Film                                    Place 1 Film Under The Tongue Twice A Da

y Maximum Daily 

Dose   2 Films                                  Unknown         







Immunizations





                                        Description

 

                                        No Information Available







Vital Signs





                Date            Vital           Result          Comment

 

                10/28/2021  9:48am Height          66 inches       5'6"

 

                    Weight              207.00 lb            

 

                    BP Systolic         130 mmHg             

 

                    BP Diastolic        81 mmHg              

 

                    Heart Rate          82 /min              

 

                    BMI (Body Mass Index) 33.4 kg/m2           







Results





                                        Description

 

                                        No Information Available







Procedures





                Date            Code            Description     Status

 

                10/28/2021      [address]           Office/Outpatient [facility] 30

-44 Minutes Completed







Medical Devices





                                        Description

 

                                        No Information Available







Encounters





           Type       Date       Location   Provider   Dx         Diagnosis

 

           Office Visit 10/28/2021  9:15a [facility] Kevyn Barakat DPM 

D21.22     [facility] 

neoplm of connctv/soft tiss of left lower limb, inc hip







Assessments





                Date            Code            Description     Provider

 

                    10/28/2021          D21.22              Benign neoplasm of c

onnective and other soft tissue of left 

lower limb, including hip               Kevyn Barakat DPM







Plan of Treatment

Future Appointment(s):* 2021  9:45 am - Kevyn Barakat DPM at [facility]

* 11/10/2021  1:00 pm - Kevyn Barakat DPM at [facility]





Functional Status





                                        Description

 

                                        No Information Available







Mental Status





                                        Description

 

                                        No Information Available







Referrals





                                        Description

 

                                        No Information Available